# Patient Record
Sex: FEMALE | Race: WHITE | NOT HISPANIC OR LATINO | URBAN - METROPOLITAN AREA
[De-identification: names, ages, dates, MRNs, and addresses within clinical notes are randomized per-mention and may not be internally consistent; named-entity substitution may affect disease eponyms.]

---

## 2020-11-10 ENCOUNTER — EMERGENCY (EMERGENCY)
Facility: HOSPITAL | Age: 75
LOS: 0 days | Discharge: HOME | End: 2020-11-10
Payer: MEDICARE

## 2020-11-10 DIAGNOSIS — Z20.828 CONTACT WITH AND (SUSPECTED) EXPOSURE TO OTHER VIRAL COMMUNICABLE DISEASES: ICD-10-CM

## 2020-11-10 LAB — SARS-COV-2 RNA SPEC QL NAA+PROBE: SIGNIFICANT CHANGE UP

## 2020-11-10 PROCEDURE — 99282 EMERGENCY DEPT VISIT SF MDM: CPT

## 2020-11-10 PROCEDURE — 99283 EMERGENCY DEPT VISIT LOW MDM: CPT | Mod: CS

## 2020-11-10 PROCEDURE — U0003: CPT

## 2020-11-10 PROCEDURE — 99283 EMERGENCY DEPT VISIT LOW MDM: CPT

## 2020-11-10 NOTE — ED STATDOCS - OBJECTIVE STATEMENT
Pt presents to ED with no complaints. pt denies any chest pain, sob, dyspnea, abdominal pain, fever, chills, n/v/d or any other complaints.  Pt concerned for possible COVID-19.   Pt here for testing.

## 2020-11-10 NOTE — ED STATDOCS - PHYSICAL EXAMINATION
Constitutional: NAD AAOx3. Nontoxic, well appearing. Speaking full sentences  w/o distress  Mouth: no airway obstruction  Cardiac: [id number]   Resp: Lungs CTAB

## 2020-11-10 NOTE — ED ADULT TRIAGE NOTE - CAS ELECT INFOMATION PROVIDED
DISCHARGE INSTRUCTIONS REVIEWED WITH PATIENT VERBALLY, PT VERBALIZED UNDERSTANDING OF DISCHARGE INSTRUCTIONS. PAPER COPY OF DISCHARGE INSTRUCTIONS GIVEN TO PATIENT WITH SELF DAVID AND COVID 19 INFORMATION./DC instructions

## 2020-11-10 NOTE — ED STATDOCS - PATIENT PORTAL LINK FT
You can access the FollowMyHealth Patient Portal offered by [facility] by registering at the following website: [email or web address]. By joining [facility]’s FollowMyHealth portal, you will also be able to view your health information using other applications (apps) compatible with our system.

## 2020-11-10 NOTE — ED STATDOCS - NS ED ROS FT
ROS:   Constitutional- no fever, no chills.    ENT- no rhinorrhea, no sore throat, no congestion.    Cardiac- no chest pain, no palpitations,   Respiratory- no cough, no SOB    Abdomen- No nausea, no vomiting, no diarrhea.

## 2021-06-23 NOTE — ED ADULT TRIAGE NOTE - MODE OF ARRIVAL
ACN spoke with pt. Pt reported that colonoscopy went well this morning. Pt will follow warfarin and bridge instructions as noted in 1/31 encounter.     No questions/ concerns at this time.   Walk in

## 2022-08-08 ENCOUNTER — OFFICE (OUTPATIENT)
Dept: URBAN - METROPOLITAN AREA CLINIC 102 | Facility: CLINIC | Age: 77
Setting detail: OPHTHALMOLOGY
End: 2022-08-08
Payer: MEDICARE

## 2022-08-08 ENCOUNTER — RX ONLY (RX ONLY)
Age: 77
End: 2022-08-08

## 2022-08-08 DIAGNOSIS — H33.313: ICD-10-CM

## 2022-08-08 DIAGNOSIS — H40.011: ICD-10-CM

## 2022-08-08 DIAGNOSIS — Z96.1: ICD-10-CM

## 2022-08-08 DIAGNOSIS — H17.89: ICD-10-CM

## 2022-08-08 DIAGNOSIS — H40.012: ICD-10-CM

## 2022-08-08 DIAGNOSIS — H40.013: ICD-10-CM

## 2022-08-08 DIAGNOSIS — H33.311: ICD-10-CM

## 2022-08-08 DIAGNOSIS — H53.2: ICD-10-CM

## 2022-08-08 DIAGNOSIS — H33.312: ICD-10-CM

## 2022-08-08 DIAGNOSIS — H25.11: ICD-10-CM

## 2022-08-08 PROCEDURE — 92004 COMPRE OPH EXAM NEW PT 1/>: CPT | Performed by: OPHTHALMOLOGY

## 2022-08-08 PROCEDURE — 92020 GONIOSCOPY: CPT | Performed by: OPHTHALMOLOGY

## 2022-08-08 ASSESSMENT — TONOMETRY
OS_IOP_MMHG: 16
OD_IOP_MMHG: 21
OS_IOP_MMHG: 15
OD_IOP_MMHG: 14

## 2022-08-08 ASSESSMENT — REFRACTION_CURRENTRX
OS_CYLINDER: 0.00
OD_AXIS: 011
OD_OVR_VA: 20/
OS_AXIS: 180
OD_CYLINDER: -0.25
OS_SPHERE: -1.50
OS_OVR_VA: 20/
OS_VPRISM_DIRECTION: PROGS
OD_SPHERE: -0.25
OD_VPRISM_DIRECTION: PROGS

## 2022-08-08 ASSESSMENT — KERATOMETRY
OD_K2POWER_DIOPTERS: 45.50
OD_K1POWER_DIOPTERS: 44.00
OS_AXISANGLE_DEGREES: 090
OD_AXISANGLE_DEGREES: 115
OS_K2POWER_DIOPTERS: 45.75
OS_K1POWER_DIOPTERS: 44.75

## 2022-08-08 ASSESSMENT — REFRACTION_AUTOREFRACTION
OD_AXIS: 028
OD_SPHERE: -0.25
OS_SPHERE: -2.25
OS_AXIS: 155
OS_CYLINDER: -0.50
OD_CYLINDER: -2.00

## 2022-08-08 ASSESSMENT — SPHEQUIV_DERIVED
OD_SPHEQUIV: -1.25
OS_SPHEQUIV: -2.5

## 2022-08-08 ASSESSMENT — VISUAL ACUITY
OD_BCVA: 20/25-
OS_BCVA: 20/30-

## 2022-08-08 ASSESSMENT — CONFRONTATIONAL VISUAL FIELD TEST (CVF)
OS_FINDINGS: FULL
OD_FINDINGS: FULL

## 2022-08-08 ASSESSMENT — CORNEAL SURGICAL SCARRING: OD_SCARRING: STROMAL

## 2022-08-08 ASSESSMENT — AXIALLENGTH_DERIVED
OD_AL: 23.6198
OS_AL: 23.9278

## 2023-04-19 ENCOUNTER — NON-APPOINTMENT (OUTPATIENT)
Age: 78
End: 2023-04-19

## 2023-04-19 ENCOUNTER — APPOINTMENT (OUTPATIENT)
Dept: OPHTHALMOLOGY | Facility: CLINIC | Age: 78
End: 2023-04-19
Payer: MEDICARE

## 2023-04-19 PROCEDURE — 92286 ANT SGM IMG I&R SPECLR MIC: CPT

## 2023-04-19 PROCEDURE — 92250 FUNDUS PHOTOGRAPHY W/I&R: CPT

## 2023-04-19 PROCEDURE — 92004 COMPRE OPH EXAM NEW PT 1/>: CPT

## 2023-04-19 PROCEDURE — 92025 CPTRIZED CORNEAL TOPOGRAPHY: CPT

## 2023-11-08 RX ORDER — OFLOXACIN 0.3 %
1 DROPS OPHTHALMIC (EYE)
Refills: 0 | Status: DISCONTINUED | OUTPATIENT
Start: 2023-11-13 | End: 2023-11-13

## 2023-11-08 RX ORDER — PHENYLEPHRINE HCL 2.5 %
1 DROPS OPHTHALMIC (EYE)
Refills: 0 | Status: DISCONTINUED | OUTPATIENT
Start: 2023-11-13 | End: 2023-11-13

## 2023-11-08 RX ORDER — TROPICAMIDE 1 %
1 DROPS OPHTHALMIC (EYE)
Refills: 0 | Status: DISCONTINUED | OUTPATIENT
Start: 2023-11-13 | End: 2023-11-13

## 2023-11-10 RX ORDER — CYCLOBENZAPRINE HYDROCHLORIDE 10 MG/1
1 TABLET, FILM COATED ORAL
Refills: 0 | DISCHARGE

## 2023-11-10 NOTE — ASU PATIENT PROFILE, ADULT - NS PREOP UNDERSTANDS INFO
No solid food/dairy/candy/gum after midnight Sunday; water allowed before 7:00am Monday; patient to come with photo ID/insurance/credit card; no jewelries/contact lens/valuables; dress in comfortable clothes; no smoking/alcohol drinking/recreational drug use Sunday; escort to come with photo ID; address and callback number was given/yes

## 2023-11-13 ENCOUNTER — APPOINTMENT (OUTPATIENT)
Dept: OPHTHALMOLOGY | Facility: AMBULATORY SURGERY CENTER | Age: 78
End: 2023-11-13

## 2023-11-13 ENCOUNTER — APPOINTMENT (OUTPATIENT)
Dept: OPHTHALMOLOGY | Facility: CLINIC | Age: 78
End: 2023-11-13
Payer: MEDICARE

## 2023-11-13 ENCOUNTER — NON-APPOINTMENT (OUTPATIENT)
Age: 78
End: 2023-11-13

## 2023-11-13 ENCOUNTER — OUTPATIENT (OUTPATIENT)
Dept: OUTPATIENT SERVICES | Facility: HOSPITAL | Age: 78
LOS: 1 days | Discharge: HOME | End: 2023-11-13
Payer: MEDICARE

## 2023-11-13 VITALS
TEMPERATURE: 98 F | HEART RATE: 71 BPM | OXYGEN SATURATION: 99 % | RESPIRATION RATE: 16 BRPM | WEIGHT: 158.29 LBS | DIASTOLIC BLOOD PRESSURE: 83 MMHG | SYSTOLIC BLOOD PRESSURE: 148 MMHG | HEIGHT: 67 IN

## 2023-11-13 VITALS
OXYGEN SATURATION: 98 % | TEMPERATURE: 98 F | SYSTOLIC BLOOD PRESSURE: 123 MMHG | RESPIRATION RATE: 16 BRPM | DIASTOLIC BLOOD PRESSURE: 78 MMHG | HEART RATE: 74 BPM

## 2023-11-13 PROBLEM — Z78.9 OTHER SPECIFIED HEALTH STATUS: Chronic | Status: ACTIVE | Noted: 2023-11-10

## 2023-11-13 PROCEDURE — 6698F: CPT | Mod: RT

## 2023-11-13 PROCEDURE — 66984 XCAPSL CTRC RMVL W/O ECP: CPT | Mod: RT

## 2023-11-13 PROCEDURE — 92015 DETERMINE REFRACTIVE STATE: CPT | Mod: NC

## 2023-11-13 DEVICE — LENS IOL CLAREON CCA0T0 18D
Type: IMPLANTABLE DEVICE | Site: RIGHT | Status: NON-FUNCTIONAL
Removed: 2023-11-13

## 2023-11-13 RX ORDER — SODIUM CHLORIDE 9 MG/ML
500 INJECTION, SOLUTION INTRAVENOUS
Refills: 0 | Status: DISCONTINUED | OUTPATIENT
Start: 2023-11-13 | End: 2023-11-13

## 2023-11-13 RX ORDER — ONDANSETRON 8 MG/1
4 TABLET, FILM COATED ORAL ONCE
Refills: 0 | Status: DISCONTINUED | OUTPATIENT
Start: 2023-11-13 | End: 2023-11-13

## 2023-11-13 RX ORDER — ACETAMINOPHEN 500 MG
650 TABLET ORAL EVERY 6 HOURS
Refills: 0 | Status: DISCONTINUED | OUTPATIENT
Start: 2023-11-13 | End: 2023-11-13

## 2023-11-13 RX ORDER — SODIUM CHLORIDE 9 MG/ML
1000 INJECTION, SOLUTION INTRAVENOUS
Refills: 0 | Status: DISCONTINUED | OUTPATIENT
Start: 2023-11-13 | End: 2023-11-13

## 2023-11-13 RX ORDER — ZOLPIDEM TARTRATE 10 MG/1
1 TABLET ORAL
Refills: 0 | DISCHARGE

## 2023-11-13 RX ORDER — DIAZEPAM 5 MG
1 TABLET ORAL
Refills: 0 | DISCHARGE

## 2023-11-13 RX ORDER — ACETAMINOPHEN 500 MG
650 TABLET ORAL ONCE
Refills: 0 | Status: DISCONTINUED | OUTPATIENT
Start: 2023-11-13 | End: 2023-11-13

## 2023-11-13 NOTE — OPERATIVE REPORT - OPERATIVE RPOSRT DETAILS
PREOPERATIVE DIAGNOSIS:  Cataract, Astigmatism-Right eye    POSTOPERATIVE DIAGNOSIS:  Same, Right eye    OPERATIVE PROCEDURE:  Femtosecond laser assisted cataract surgery (FLACS) with insertion of posterior chamber intraocular lens, Right eye    LENS USED: [id number]    LENS POWER: +18.00 D    PROCEDURE:  The patient was brought into the laser room. After installation of topical anesthetic the femtosecond laser was used for select steps of the cataract procedure.   The patient was brought into the operating room and placed supine on the operating room table. After uneventful induction of neuroleptic anesthesia, additional lidocaine gel was instilled into the operative eye achieving sufficient anesthesia. The patient was then prepped and draped in the usual sterile fashion. A wire lid speculum was then inserted into the operative eye giving a wide palpebral fissure.    A norma knife was used to perform a paracentesis through clear cornea at the 10 o'clock limbal position. The anterior chamber was irrigated with lidocaine 1% nonpreserved. When available preservative free epinephrine was also placed intracamerally for additional pupil dilation.  Viscoelastic was then used to maintain the anterior chamber. A keratome was used to create a clear corneal incision just inside the temporal limbus. An Utrata forceps was used to complete the anterior capsulorrhexis and the freed capsule was removed from the eye. Balanced salt solution was used to hydrodissect the nucleus. The phacoemulsification unit was then used to completely phacoemulsify the nucleus, following which an aspirating hand piece was used to aspirate all cortical remnants from the capsular bag. Viscoelastic was again used to reform the anterior chamber and capsular bag.    A new foldable posterior chamber intraocular lens was brought into the surgical field. It was folded and directly injected into the capsular bag following which it was centered and secure. All viscoelastic was then aspirated from the anterior segment using an aspirating hand piece. All wounds were hydrated and found to be watertight.  The wounds remained watertight. The lid speculum was removed from the eye and a shield placed over the eye.  An antibiotic/steroid mixture was irrigated into the conjunctival cul de sac. The patient tolerated the procedure well and went to the recovery area in good condition.

## 2023-11-14 ENCOUNTER — APPOINTMENT (OUTPATIENT)
Dept: OPHTHALMOLOGY | Facility: CLINIC | Age: 78
End: 2023-11-14
Payer: MEDICARE

## 2023-11-14 ENCOUNTER — NON-APPOINTMENT (OUTPATIENT)
Age: 78
End: 2023-11-14

## 2023-11-14 PROCEDURE — 99024 POSTOP FOLLOW-UP VISIT: CPT

## 2023-11-21 ENCOUNTER — APPOINTMENT (OUTPATIENT)
Dept: OPHTHALMOLOGY | Facility: CLINIC | Age: 78
End: 2023-11-21
Payer: MEDICARE

## 2023-11-21 ENCOUNTER — NON-APPOINTMENT (OUTPATIENT)
Age: 78
End: 2023-11-21

## 2023-11-21 PROCEDURE — 99024 POSTOP FOLLOW-UP VISIT: CPT

## 2023-12-13 ENCOUNTER — APPOINTMENT (OUTPATIENT)
Dept: OPHTHALMOLOGY | Facility: CLINIC | Age: 78
End: 2023-12-13
Payer: MEDICARE

## 2023-12-13 ENCOUNTER — NON-APPOINTMENT (OUTPATIENT)
Age: 78
End: 2023-12-13

## 2023-12-13 PROCEDURE — 99024 POSTOP FOLLOW-UP VISIT: CPT

## 2024-10-01 PROBLEM — Z00.00 ENCOUNTER FOR PREVENTIVE HEALTH EXAMINATION: Status: ACTIVE | Noted: 2024-10-01

## 2024-10-22 ENCOUNTER — APPOINTMENT (OUTPATIENT)
Dept: CT IMAGING | Facility: CLINIC | Age: 79
End: 2024-10-22
Payer: MEDICARE

## 2024-10-22 ENCOUNTER — OUTPATIENT (OUTPATIENT)
Dept: OUTPATIENT SERVICES | Facility: HOSPITAL | Age: 79
LOS: 1 days | End: 2024-10-22
Payer: MEDICARE

## 2024-10-22 DIAGNOSIS — Z00.8 ENCOUNTER FOR OTHER GENERAL EXAMINATION: ICD-10-CM

## 2024-10-22 PROCEDURE — 71250 CT THORAX DX C-: CPT | Mod: 26,MH

## 2024-11-20 PROCEDURE — 71250 CT THORAX DX C-: CPT | Mod: MH

## (undated) DEVICE — PACK CENTURION 2.4MM

## (undated) DEVICE — SOL BALANCE SALT 15ML

## (undated) DEVICE — SUT NYLON 10-0 12" CU-5

## (undated) DEVICE — GOWN ROYAL SILK XL

## (undated) DEVICE — KNIFE ALCON PARACENTESIS CLEARCUT SIDEPORT 1MM (YELLOW)

## (undated) DEVICE — CANNULA IRR ANT CHAMBER 30G

## (undated) DEVICE — KNIFE ALCON PARACENTESIS CLEARCUT SIDEPORT 1.2MM (YELLOW)

## (undated) DEVICE — DRAPE MICROSCOPE KNOB COVER SMALL (2 PCS)

## (undated) DEVICE — CAPSULE RETRACTOR MST

## (undated) DEVICE — SOL IRR BAG BSS 500ML

## (undated) DEVICE — PACK ANTERIOR SEGMENT

## (undated) DEVICE — GLV 7.5 PROTEXIS (WHITE)

## (undated) DEVICE — SOL IRR BAL SALT 500ML